# Patient Record
Sex: FEMALE | Race: ASIAN | NOT HISPANIC OR LATINO | Employment: FULL TIME | URBAN - METROPOLITAN AREA
[De-identification: names, ages, dates, MRNs, and addresses within clinical notes are randomized per-mention and may not be internally consistent; named-entity substitution may affect disease eponyms.]

---

## 2018-04-24 ENCOUNTER — COMMUNICATION - HEALTHEAST (OUTPATIENT)
Dept: SCHEDULING | Facility: CLINIC | Age: 28
End: 2018-04-24

## 2020-06-08 ENCOUNTER — COMMUNICATION - HEALTHEAST (OUTPATIENT)
Dept: SCHEDULING | Facility: CLINIC | Age: 30
End: 2020-06-08

## 2021-08-22 ENCOUNTER — HEALTH MAINTENANCE LETTER (OUTPATIENT)
Age: 31
End: 2021-08-22

## 2021-10-17 ENCOUNTER — HEALTH MAINTENANCE LETTER (OUTPATIENT)
Age: 31
End: 2021-10-17

## 2022-10-02 ENCOUNTER — HEALTH MAINTENANCE LETTER (OUTPATIENT)
Age: 32
End: 2022-10-02

## 2023-08-29 ENCOUNTER — LAB REQUISITION (OUTPATIENT)
Dept: LAB | Facility: CLINIC | Age: 33
End: 2023-08-29

## 2023-08-29 DIAGNOSIS — Z13.220 ENCOUNTER FOR SCREENING FOR LIPOID DISORDERS: ICD-10-CM

## 2023-08-29 DIAGNOSIS — Z13.1 ENCOUNTER FOR SCREENING FOR DIABETES MELLITUS: ICD-10-CM

## 2023-08-29 LAB
ANION GAP SERPL CALCULATED.3IONS-SCNC: 10 MMOL/L (ref 7–15)
BUN SERPL-MCNC: 12.8 MG/DL (ref 6–20)
CALCIUM SERPL-MCNC: 9 MG/DL (ref 8.6–10)
CHLORIDE SERPL-SCNC: 105 MMOL/L (ref 98–107)
CHOLEST SERPL-MCNC: 193 MG/DL
CREAT SERPL-MCNC: 0.84 MG/DL (ref 0.51–0.95)
DEPRECATED HCO3 PLAS-SCNC: 26 MMOL/L (ref 22–29)
GFR SERPL CREATININE-BSD FRML MDRD: >90 ML/MIN/1.73M2
GLUCOSE SERPL-MCNC: 96 MG/DL (ref 70–99)
HDLC SERPL-MCNC: 53 MG/DL
LDLC SERPL CALC-MCNC: 110 MG/DL
NONHDLC SERPL-MCNC: 140 MG/DL
POTASSIUM SERPL-SCNC: 4 MMOL/L (ref 3.4–5.3)
SODIUM SERPL-SCNC: 141 MMOL/L (ref 136–145)
TRIGL SERPL-MCNC: 152 MG/DL

## 2023-08-29 PROCEDURE — 80061 LIPID PANEL: CPT | Performed by: PHYSICIAN ASSISTANT

## 2023-08-29 PROCEDURE — 82310 ASSAY OF CALCIUM: CPT | Performed by: PHYSICIAN ASSISTANT

## 2023-10-21 ENCOUNTER — HEALTH MAINTENANCE LETTER (OUTPATIENT)
Age: 33
End: 2023-10-21

## 2024-02-06 DIAGNOSIS — M26.03 MANDIBULAR HYPERPLASIA: Primary | ICD-10-CM

## 2024-04-17 RX ORDER — TRANEXAMIC ACID 10 MG/ML
1 INJECTION, SOLUTION INTRAVENOUS ONCE
Status: CANCELLED | OUTPATIENT
Start: 2024-04-17 | End: 2024-04-17

## 2024-04-18 NOTE — PRE-PROCEDURE
"ORAL & MAXILLOFACIAL SURGERY   PREOPERATIVE  NOTE:  Arlet Arroyo,  MRN: [id number],  : 1990      Date of Procedure:   2024    Pre-Operative Diagnosis:  - Class III skeletal and dental relationship resulting in difficulty chewing and masticating.     Planned Procedure:  - BSSO and possibly a LeFort    Planned Anesthesia: General via nasal endotracheal tube    Attending Surgeon:  Dr. Johnathan Somers DDS    Resident Surgeon:  Dr. Mary Mortensen     Resident Assistants:  Dr. Taylor Valdez    Consent:  Written and verbal consent obtained from patient previously. Discussion included  risks/complications including but not limited post-operative pain, swelling, bleeding,  infection, hardware failure, nonunion, malunion, dehiscence of wounds,  temporary/permanent paresthesia/anesthesia of CN V3 mental nerve distribution/CN V2  maxillary nerve distribution, damage to CN VII (motor to muscles of facial expression)  with temporary or permanent paralysis, scar formation, malocclusion, failure to resolve  chief complaint, or need for additional procedures (occlusal equilibration/endodontic  therapy). Patient agrees to procedure as written, signed consent in chart.      Shreya Bowman, ZA  Oral & Maxillofacial Surgery, Intern   _________________________________________________________________________________________________________________    OMFS Orthognathic Consultation - 10/19/23    CC: \"My teeth don't come together. I have pain in my TMJs.\"    HPI:   32 female who presents to the Oral and Maxillofacial Surgery Department for evaluation of her skeletal-dental deformity. Patient was referred by their orthodontist, [facility] for preorthognathic evaluation. Patient was asked to fill out a motivation questionnaire in which they checked the following items: they would like to make the upper front teeth fit the lower jaw. They would like to move upper teeth down, so they will be able to bring their front " teeth together. In addition, the patient voices multiple functional concerns regarding inability to properly bite, chew, and swallow food primarily due to inability to bring their front teeth together and states that a portion of the food is often left behind when attempting to bite into something. Patient reports occasional pain/discomfort in their TMJs . She otherwise denies headache, chest pain, shortness of breath, numbness, swelling, dysphagia, drainage, foul taste, fever, chills, nausea, or vomiting.The patient stated that she has had TMJ issues due to her occlusion. She stated it has been his way for as long as she can remember, and when she touches the side of her face near her condyles she has pain.     The obstructive sleep apnea assessment was done during the interview. The patient does admit to snoring, and has been told that they stopped breathing at night. Patient reports excessive daytime sleepiness, but denies any frequent morning headaches and nightmares. The patient has never been diagnosed with sleep apnea, never had a sleep study, and never been treated for it. There is no history of TMJ pain, open or close lock.    PMH:   Non contributory    PSH:   denies    Meds:   denies    Allergies:  NKDA    Family History:  denies bleeding or anesthesia problems    Social History:   (-) tobacco, (-) etoh, (-) illicit or recreational drug use    ROS:   comprehensive review of systems completed and negative aside from listed in HPI.    Physical Exam:  GEN: WD/WN and NAD  HEENT: NC/AT, EOMI, PERRL, no facial edema, no asymmetry, no induration or erythema, no abnormal skin lesions, inferior border of mandible continuous, neck soft and supple with no palpable lymph nodes, and CAMILA >45mm  I/O: OP clear, uvula midline, fair OH, grossly intact dentition, no vestibular edema, FOM NT/ND, no erythema, no drainage, no purulence, no ulcerations, no lesions , #1 missing, #16 missing, #17 missing, and #32 missing  Neuro:  AAOx4, CN V grossly intact bilaterally, and CN VII grossly intact bilaterally  Cardio: warm, well profused and no pitting edema  Pulm: Breathing comfortably on room air, no respiratory distress    HEENT:  Facial exam reveals the following measurements  1. Upper facial third mm: 65  2. Middle facial third mm: 58  3. Lower facial third mm: 65  4. Upper lower third mm: 24  5. Lower lower two-thirds mm: 44  6. Scleral show mm: 0  7. Infraorbital rim projection mm: -2  8. Chin is deviation compared to midsagittal plane mm: Coincident  9. Chin projection: Normal  10. Labiomental fold: Normal    Lip - tooth measurements  1. Upper lip length mm: 24  2. Interlabial distance mm: 0  3. Upper incisor show at rest mm: 0  4. Gingival show at rest mm: 0  5. Upper incisors show with animation mm: 9  6. Gingival show with animation mm: 0    Nasal Exam:  1. Dorsal nasal projection mm: Normal  2. Dorsal convexity: Mild dorsal hump  3. Nasal septum deviations: Coincident  4. Nasal tip deviation: Right 1 mm  5. Alar base mm: 36  6. Nasolabial angle: Acute  7. Columella show mm: 3    Dental Examination:  1. Right canine to right medial canthus distance mm: 60  2. Left canine to left medial canthus distance is mm: 60  3. Occlusal cant mm: 0  4. Maxillary midline to midsagittal plane mm: Coincident  5. Mandibular midline to maxillary midline mm: Coincident  6. Mandibular midline to menton mm: Coincident  7. Mentum midline to midsagittal plane mm: Left 1 mm  8. The right angle classification: canine class III, molar class III  9. The left angle classification: canine class III, molar class III  10. Overjet mm: +1  11. Overbite mm: +2    Temporomandibular Joint Exam  1. Maximum interincisal distance mm: 55  2. Lateral excursive movements mm:  Right 3, Left 4  3. Protrusive movement mm: 12  4. Palpation of the joint reveals bilateral pain / discomfort, right reciprocal click, no clicking, and no crepitus    Radiographic Evaluation:  OPG -  Independently reviewed. 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     Plan:  Physical exam findings shared and discussed with the patient at length  Lengthy discussion with the patient regarding the risk, benefits, and alternatives of maxillary and mandibular surgery including, but not limited to bleeding, bruising, infection, swelling, pain, discomfort, osteomyelitis, osteonecrosis, malunion, nonunion, relapse, injury to nerves, vessels, teeth, and soft tissue, permanent facial numbness and weakness including permanent lip and chin numbness, permanent tongue numbness, permanent loss of face, blindness, need for another procedure, progression of temporomandibular joint symptoms/pain, unsightly scarring, blood loss, and need for transfusion as well as general anesthesia risk including heart attacks, stroke, and death.  Clinic to coordinate CBCT and intraoral scans to facilitate virtual surgical planning in the future when orthodontically decompensated  The proposed treatment plan will be discussed with the patient's orthodontist and once the orthodontic movements are finalized will proceed with virtual surgical planning to determine the final surgical plans.  All questions answered and all concerns addressed    They had the opportunity to ask questions which were answered to their satisfaction. This discussion will be repeated at additional preoperative appointments.    Shreya Bowman DMD  [facility] Intern    Findings, assessment, and plan discussed  with Johnathan Somers MD, OSBALDOS  See student/resident's note above for details. As the approving (supervising) , I attest that I've seen and evaluated the patient or reviewed the patient findings real time. I attest I was present for any critical or key portions of the treatment and agree with the student/resident's treatments, findings and plans as written.  Johnathan Somers DDS, MD, FACS  Staff, Oral and Maxillofacial Surgery

## 2024-04-18 NOTE — H&P
"ORAL & MAXILLOFACIAL SURGERY HISTORY AND PHYSICAL    CC: \"I'd like my teeth to come together, and I would like to move my chin back\"    HISTORY OF PRESENT ILLNESS:   34 y/o F presents for evaluation for orthognathic surgery, patient has class III skeletal and dental relationship resulting in difficulty chewing and masticating. She notes that she has been in orthodontic treatment for about 3 years and is ready for surgery. Denies any other constitutional symptoms.    PMH:  Denies    PAST SURGICAL HISTORY:  Denies    Patient has not had any general anesthetic, however she has immediate family members that have had general anesthetic without complications    MEDICATIONS:  Denies    ALLERGIES:  NKDA    SOCIAL HISTORY:  Social alcohol use    REVIEW OF SYSTEMS  ROS reviewed and negative aside from listed in HPI    PHYSICAL EXAM:  GEN: WD/WN, NAD  HEENT: NC/AT, EOMI, PERRL, no facial edema, induration or erythema, no abnormal skin lesions, inferior border of mandible is palpable bilaterally, neck soft with no palpable lymph nodes, CAMILA >45mm, OP clear, uvula midline, fair oral hygiene, intact adult dentition, no vestibular edema, FOM ND/NT, no erythema/ulcerations/pigmentations/exophytic lesions, class III dental and skeletal relationship, anterior cross bite, maxillary midline coincident with skeletal midline, with animation all maxillary incisor show with 2-3 mm of gingiva  CV: Warm and well-perfused, RRR, no murmurs/rubs/gallops.  PULM: Breathing comfortably on room air, CTAB, no rales/rhonchi/wheezing  GI: soft, ND/NT  MSK: FLOOD, 5/5 strength to all extremities, no peripheral extremity edema  Neuro: AAOx4, CN II-XII intact bilaterally    VITALS:  BP:   116/79     P: 87  SpO2: 99%  Ht: 5' 0\"         Wt: 58.1 kg     Mallampati II and normal neck mobility observed.    IMAGING:  CBCT obtained and reviewed on 4/1/24  Orthodontic treatment present, missing teeth #1, 16, 17, and 32, class III skeletal and dental relationship, " mandibular hyperplasia    ASSESSMENT:   34 y/o F presents for evaluation for orthognathic surgery, patient has class III skeletal and dental relationship resulting in difficulty chewing and masticating, patient indicated for orthognathic surgery    PLAN:  - VSP planned for 4/4/24, that will determine BSSO vs BSSO and LeFort  - Reviewed orthognathic surgery with patient and post operative course  - Encouraged and asnwered all questions    Risks and benefits explained to pt including, but not limited to;   Pain, bleeding, swelling, infections, remaining tooth roots, oroantral communication, nerve injury (permanent vs temporary).  Questions were invited and answered.  Anesthesia modalities covered, patient elects to have procedure done under [facility] in the OR    NV:   - After VSP review of surgical plan, then OR for orthognathic surgery     Mary Aparicio DDS  OMFS, PGY-4    Findings, assessment, and plan discussed with Dr. Somers    See student/resident's note above for details. As the approving (supervising) , I attest that I've seen and evaluated the patient or reviewed the patient findings real time. I attest I was present for any critical or key portions of the treatment and agree with the student/resident's treatments, findings and plans as written.  Johnathan Somers DDS, MD, FACS  Staff, Oral and Maxillofacial Surgery     See student/resident's note above for details. As the approving(supervising) , I attest that I've seen and evaluated the patient, was present for the critical or key portions of the treatment and agree with the student/resident's treatments, findings and plans as written.

## 2024-04-29 ENCOUNTER — ANESTHESIA EVENT (OUTPATIENT)
Dept: SURGERY | Facility: CLINIC | Age: 34
End: 2024-04-29
Payer: COMMERCIAL

## 2024-04-29 RX ORDER — OXYCODONE HYDROCHLORIDE 10 MG/1
10 TABLET ORAL
Status: CANCELLED | OUTPATIENT
Start: 2024-04-29

## 2024-04-29 RX ORDER — ONDANSETRON 4 MG/1
4 TABLET, ORALLY DISINTEGRATING ORAL EVERY 30 MIN PRN
Status: CANCELLED | OUTPATIENT
Start: 2024-04-29

## 2024-04-29 RX ORDER — OXYCODONE HYDROCHLORIDE 5 MG/1
5 TABLET ORAL
Status: CANCELLED | OUTPATIENT
Start: 2024-04-29

## 2024-04-29 RX ORDER — NALOXONE HYDROCHLORIDE 0.4 MG/ML
0.1 INJECTION, SOLUTION INTRAMUSCULAR; INTRAVENOUS; SUBCUTANEOUS
Status: CANCELLED | OUTPATIENT
Start: 2024-04-29

## 2024-04-29 RX ORDER — ONDANSETRON 2 MG/ML
4 INJECTION INTRAMUSCULAR; INTRAVENOUS EVERY 30 MIN PRN
Status: CANCELLED | OUTPATIENT
Start: 2024-04-29

## 2024-04-29 NOTE — ANESTHESIA PREPROCEDURE EVALUATION
"Anesthesia Pre-Procedure Evaluation    Patient: Arlet Arroyo   MRN: [id number] : 1990        Procedure : Procedure(s):  MANDIBULAR SAGITTAL SPLIT OSTEOTOMY          History reviewed. No pertinent past medical history.   Past Surgical History:   Procedure Laterality Date    NO HISTORY OF SURGERY        No Known Allergies   Social History     Tobacco Use    Smoking status: Never    Smokeless tobacco: Not on file   Substance Use Topics    Alcohol use: No      Wt Readings from Last 1 Encounters:   16 54.5 kg (120 lb 1.6 oz)        Anesthesia Evaluation   Pt has not had prior anesthetic         ROS/MED HX  ENT/Pulmonary: Comment: Mandibular hyperplasia, occlusal malalignment      Neurologic:  - neg neurologic ROS     Cardiovascular:  - neg cardiovascular ROS     METS/Exercise Tolerance: >4 METS    Hematologic:  - neg hematologic  ROS     Musculoskeletal:  - neg musculoskeletal ROS     GI/Hepatic:  - neg GI/hepatic ROS     Renal/Genitourinary:  - neg Renal ROS     Endo:  - neg endo ROS     Psychiatric/Substance Use:  - neg psychiatric ROS     Infectious Disease:  - neg infectious disease ROS     Malignancy:  - neg malignancy ROS     Other:  - neg other ROS          Physical Exam    Airway        Mallampati: II   TM distance: > 3 FB   Neck ROM: full   Mouth opening: > 3 cm    Respiratory Devices and Support         Dental     Comment: Braces on upper and lower teeth    (+) Completely normal teeth      Cardiovascular          Rhythm and rate: regular and normal     Pulmonary           breath sounds clear to auscultation           OUTSIDE LABS:  CBC: No results found for: \"WBC\", \"HGB\", \"HCT\", \"PLT\"  BMP:   Lab Results   Component Value Date     2023    POTASSIUM 4.0 2023    CHLORIDE 105 2023    CO2 26 2023    BUN 12.8 2023    CR 0.84 2023    GLC 96 2023     COAGS: No results found for: \"PTT\", \"INR\", \"FIBR\"  POC: No results found for: \"BGM\", \"HCG\", \"HCGS\"  HEPATIC: " "No results found for: \"ALBUMIN\", \"PROTTOTAL\", \"ALT\", \"AST\", \"GGT\", \"ALKPHOS\", \"BILITOTAL\", \"BILIDIRECT\", \"NEO\"  OTHER:   Lab Results   Component Value Date    KATERINA 9.0 08/29/2023       Anesthesia Plan    ASA Status:  1    NPO Status:  NPO Appropriate    Anesthesia Type: General.     - Airway: ETT   Induction: Intravenous, Propofol.   Maintenance: Balanced.   Techniques and Equipment:     - Airway: Nasal FLACO, Fiberoptic Bronchoscope (elective FOI)     - Lines/Monitors: 2nd IV, BIS     Consents    Anesthesia Plan(s) and associated risks, benefits, and realistic alternatives discussed. Questions answered and patient/representative(s) expressed understanding.     - Discussed: Risks, Benefits and Alternatives for BOTH SEDATION and the PROCEDURE were discussed     - Discussed with:  Patient      - Extended Intubation/Ventilatory Support Discussed: Yes.      - Patient is DNR/DNI Status: No     Use of blood products discussed: No .     Postoperative Care    Pain management: IV analgesics, Oral pain medications, Multi-modal analgesia.   PONV prophylaxis: Ondansetron (or other 5HT-3), Dexamethasone or Solumedrol, Scopolamine patch     Comments:               Michael Lopez MD    I have reviewed the pertinent notes and labs in the chart from the past 30 days and (re)examined the patient.  Any updates or changes from those notes are reflected in this note.                  "

## 2024-04-30 ENCOUNTER — HOSPITAL ENCOUNTER (OUTPATIENT)
Facility: CLINIC | Age: 34
Discharge: HOME | End: 2024-04-30
Attending: DENTIST | Admitting: DENTIST
Payer: COMMERCIAL

## 2024-04-30 ENCOUNTER — ANESTHESIA (OUTPATIENT)
Dept: SURGERY | Facility: CLINIC | Age: 34
End: 2024-04-30
Payer: COMMERCIAL

## 2024-04-30 ENCOUNTER — APPOINTMENT (OUTPATIENT)
Dept: INTERPRETER SERVICES | Facility: CLINIC | Age: 34
End: 2024-04-30
Attending: DENTIST
Payer: COMMERCIAL

## 2024-04-30 VITALS
OXYGEN SATURATION: 97 % | TEMPERATURE: 96.3 F | RESPIRATION RATE: 18 BRPM | DIASTOLIC BLOOD PRESSURE: 75 MMHG | WEIGHT: 130.29 LBS | SYSTOLIC BLOOD PRESSURE: 109 MMHG | BODY MASS INDEX: 25.58 KG/M2 | HEART RATE: 70 BPM | HEIGHT: 60 IN

## 2024-04-30 DIAGNOSIS — Q18.8 FACIAL DYSMORPHIA: Primary | ICD-10-CM

## 2024-04-30 LAB — HGB BLD-MCNC: 13.9 G/DL (ref 11.7–15.7)

## 2024-04-30 PROCEDURE — 250N000025 HC SEVOFLURANE, PER MIN: Performed by: DENTIST

## 2024-04-30 PROCEDURE — 370N000017 HC ANESTHESIA TECHNICAL FEE, PER MIN: Performed by: DENTIST

## 2024-04-30 PROCEDURE — 250N000011 HC RX IP 250 OP 636

## 2024-04-30 PROCEDURE — 250N000013 HC RX MED GY IP 250 OP 250 PS 637

## 2024-04-30 PROCEDURE — 36415 COLL VENOUS BLD VENIPUNCTURE: CPT | Performed by: ANESTHESIOLOGY

## 2024-04-30 PROCEDURE — 250N000009 HC RX 250

## 2024-04-30 PROCEDURE — C1713 ANCHOR/SCREW BN/BN,TIS/BN: HCPCS | Performed by: DENTIST

## 2024-04-30 PROCEDURE — 21195 RECONST LWR JAW W/O FIXATION: CPT | Performed by: ANESTHESIOLOGY

## 2024-04-30 PROCEDURE — 258N000003 HC RX IP 258 OP 636

## 2024-04-30 PROCEDURE — 272N000001 HC OR GENERAL SUPPLY STERILE: Performed by: DENTIST

## 2024-04-30 PROCEDURE — 85018 HEMOGLOBIN: CPT | Performed by: ANESTHESIOLOGY

## 2024-04-30 PROCEDURE — 710N000012 HC RECOVERY PHASE 2, PER MINUTE: Performed by: DENTIST

## 2024-04-30 PROCEDURE — 360N000077 HC SURGERY LEVEL 4, PER MIN: Performed by: DENTIST

## 2024-04-30 PROCEDURE — 250N000013 HC RX MED GY IP 250 OP 250 PS 637: Performed by: DENTIST

## 2024-04-30 PROCEDURE — 250N000009 HC RX 250: Performed by: DENTIST

## 2024-04-30 PROCEDURE — 710N000010 HC RECOVERY PHASE 1, LEVEL 2, PER MIN: Performed by: DENTIST

## 2024-04-30 PROCEDURE — 999N000141 HC STATISTIC PRE-PROCEDURE NURSING ASSESSMENT: Performed by: DENTIST

## 2024-04-30 DEVICE — IMP SCR SYN MATRIX COARSE PITCH 1.85X14MM ST 04.511.214.01: Type: IMPLANTABLE DEVICE | Site: MANDIBLE | Status: FUNCTIONAL

## 2024-04-30 DEVICE — IMP SCR SYN MATRIX COARSE PITCH 1.85X16MM ST 04.511.216.01: Type: IMPLANTABLE DEVICE | Site: MANDIBLE | Status: FUNCTIONAL

## 2024-04-30 DEVICE — IMP SCR SYN MATRIX COARSE PITCH 1.85X12MM ST 04.511.212.01: Type: IMPLANTABLE DEVICE | Site: MANDIBLE | Status: FUNCTIONAL

## 2024-04-30 RX ORDER — ONDANSETRON 4 MG/1
4 TABLET, ORALLY DISINTEGRATING ORAL EVERY 30 MIN PRN
Status: DISCONTINUED | OUTPATIENT
Start: 2024-04-30 | End: 2024-04-30 | Stop reason: HOSPADM

## 2024-04-30 RX ORDER — CHLORHEXIDINE GLUCONATE ORAL RINSE 1.2 MG/ML
SOLUTION DENTAL PRN
Status: DISCONTINUED | OUTPATIENT
Start: 2024-04-30 | End: 2024-04-30 | Stop reason: HOSPADM

## 2024-04-30 RX ORDER — ACETAMINOPHEN 160 MG/5ML
640 SUSPENSION ORAL EVERY 6 HOURS PRN
Qty: 473 ML | Refills: 0 | Status: SHIPPED | OUTPATIENT
Start: 2024-04-30 | End: 2024-05-07

## 2024-04-30 RX ORDER — BISACODYL 10 MG
10 SUPPOSITORY, RECTAL RECTAL DAILY PRN
Status: CANCELLED | OUTPATIENT
Start: 2024-05-03

## 2024-04-30 RX ORDER — PETROLATUM,WHITE
OINTMENT IN PACKET (GRAM) TOPICAL
Status: CANCELLED | OUTPATIENT
Start: 2024-04-30

## 2024-04-30 RX ORDER — KETOROLAC TROMETHAMINE 30 MG/ML
INJECTION, SOLUTION INTRAMUSCULAR; INTRAVENOUS PRN
Status: DISCONTINUED | OUTPATIENT
Start: 2024-04-30 | End: 2024-04-30

## 2024-04-30 RX ORDER — AMOXICILLIN AND CLAVULANATE POTASSIUM 400; 57 MG/5ML; MG/5ML
800 POWDER, FOR SUSPENSION ORAL 2 TIMES DAILY
Qty: 140 ML | Refills: 0 | Status: SHIPPED | OUTPATIENT
Start: 2024-04-30 | End: 2024-05-07

## 2024-04-30 RX ORDER — KETOROLAC TROMETHAMINE 30 MG/ML
30 INJECTION, SOLUTION INTRAMUSCULAR; INTRAVENOUS EVERY 6 HOURS
Status: CANCELLED | OUTPATIENT
Start: 2024-04-30 | End: 2024-05-05

## 2024-04-30 RX ORDER — GABAPENTIN 300 MG/1
300 CAPSULE ORAL ONCE
Status: COMPLETED | OUTPATIENT
Start: 2024-04-30 | End: 2024-04-30

## 2024-04-30 RX ORDER — FENTANYL CITRATE 50 UG/ML
INJECTION, SOLUTION INTRAMUSCULAR; INTRAVENOUS PRN
Status: DISCONTINUED | OUTPATIENT
Start: 2024-04-30 | End: 2024-04-30

## 2024-04-30 RX ORDER — SODIUM CHLORIDE, SODIUM LACTATE, POTASSIUM CHLORIDE, CALCIUM CHLORIDE 600; 310; 30; 20 MG/100ML; MG/100ML; MG/100ML; MG/100ML
INJECTION, SOLUTION INTRAVENOUS CONTINUOUS PRN
Status: DISCONTINUED | OUTPATIENT
Start: 2024-04-30 | End: 2024-04-30

## 2024-04-30 RX ORDER — ONDANSETRON 2 MG/ML
INJECTION INTRAMUSCULAR; INTRAVENOUS PRN
Status: DISCONTINUED | OUTPATIENT
Start: 2024-04-30 | End: 2024-04-30

## 2024-04-30 RX ORDER — HYDROMORPHONE HCL IN WATER/PF 6 MG/30 ML
0.4 PATIENT CONTROLLED ANALGESIA SYRINGE INTRAVENOUS EVERY 5 MIN PRN
Status: DISCONTINUED | OUTPATIENT
Start: 2024-04-30 | End: 2024-04-30 | Stop reason: HOSPADM

## 2024-04-30 RX ORDER — OXYCODONE HCL 5 MG/5 ML
5 SOLUTION, ORAL ORAL ONCE
Status: COMPLETED | OUTPATIENT
Start: 2024-04-30 | End: 2024-04-30

## 2024-04-30 RX ORDER — CEFAZOLIN SODIUM/WATER 2 G/20 ML
2 SYRINGE (ML) INTRAVENOUS
Status: COMPLETED | OUTPATIENT
Start: 2024-04-30 | End: 2024-04-30

## 2024-04-30 RX ORDER — IBUPROFEN 100 MG/5ML
600 SUSPENSION, ORAL (FINAL DOSE FORM) ORAL EVERY 6 HOURS PRN
Qty: 473 ML | Refills: 0 | Status: SHIPPED | OUTPATIENT
Start: 2024-04-30

## 2024-04-30 RX ORDER — AMPICILLIN AND SULBACTAM 2; 1 G/1; G/1
3 INJECTION, POWDER, FOR SOLUTION INTRAMUSCULAR; INTRAVENOUS EVERY 6 HOURS
Status: CANCELLED | OUTPATIENT
Start: 2024-04-30 | End: 2024-05-01

## 2024-04-30 RX ORDER — SODIUM CHLORIDE, SODIUM LACTATE, POTASSIUM CHLORIDE, CALCIUM CHLORIDE 600; 310; 30; 20 MG/100ML; MG/100ML; MG/100ML; MG/100ML
INJECTION, SOLUTION INTRAVENOUS CONTINUOUS
Status: DISCONTINUED | OUTPATIENT
Start: 2024-04-30 | End: 2024-04-30 | Stop reason: HOSPADM

## 2024-04-30 RX ORDER — ACETAMINOPHEN 325 MG/1
975 TABLET ORAL ONCE
Status: COMPLETED | OUTPATIENT
Start: 2024-04-30 | End: 2024-04-30

## 2024-04-30 RX ORDER — MELOXICAM 7.5 MG/1
15 TABLET ORAL ONCE
Status: COMPLETED | OUTPATIENT
Start: 2024-04-30 | End: 2024-04-30

## 2024-04-30 RX ORDER — ONDANSETRON 2 MG/ML
4 INJECTION INTRAMUSCULAR; INTRAVENOUS EVERY 30 MIN PRN
Status: DISCONTINUED | OUTPATIENT
Start: 2024-04-30 | End: 2024-04-30 | Stop reason: HOSPADM

## 2024-04-30 RX ORDER — ACETAMINOPHEN 325 MG/10.15ML
650 LIQUID ORAL EVERY 6 HOURS
Status: CANCELLED | OUTPATIENT
Start: 2024-04-30

## 2024-04-30 RX ORDER — ONDANSETRON 2 MG/ML
4 INJECTION INTRAMUSCULAR; INTRAVENOUS EVERY 6 HOURS PRN
Status: CANCELLED | OUTPATIENT
Start: 2024-04-30

## 2024-04-30 RX ORDER — FENTANYL CITRATE 50 UG/ML
50 INJECTION, SOLUTION INTRAMUSCULAR; INTRAVENOUS EVERY 5 MIN PRN
Status: DISCONTINUED | OUTPATIENT
Start: 2024-04-30 | End: 2024-04-30 | Stop reason: HOSPADM

## 2024-04-30 RX ORDER — BENZOCAINE/MENTHOL 6 MG-10 MG
LOZENGE MUCOUS MEMBRANE EVERY 6 HOURS PRN
Status: CANCELLED | OUTPATIENT
Start: 2024-04-30

## 2024-04-30 RX ORDER — DEXAMETHASONE SODIUM PHOSPHATE 10 MG/ML
8 INJECTION, SOLUTION INTRAMUSCULAR; INTRAVENOUS EVERY 8 HOURS
Status: CANCELLED | OUTPATIENT
Start: 2024-04-30 | End: 2024-05-01

## 2024-04-30 RX ORDER — ACETAMINOPHEN 325 MG/1
975 TABLET ORAL ONCE
Status: DISCONTINUED | OUTPATIENT
Start: 2024-04-30 | End: 2024-04-30 | Stop reason: HOSPADM

## 2024-04-30 RX ORDER — BUPIVACAINE HYDROCHLORIDE AND EPINEPHRINE 2.5; 5 MG/ML; UG/ML
INJECTION, SOLUTION EPIDURAL; INFILTRATION; INTRACAUDAL; PERINEURAL PRN
Status: DISCONTINUED | OUTPATIENT
Start: 2024-04-30 | End: 2024-04-30 | Stop reason: HOSPADM

## 2024-04-30 RX ORDER — ONDANSETRON 4 MG/1
4 TABLET, ORALLY DISINTEGRATING ORAL EVERY 8 HOURS PRN
Qty: 12 TABLET | Refills: 0 | Status: SHIPPED | OUTPATIENT
Start: 2024-04-30

## 2024-04-30 RX ORDER — HYDROMORPHONE HCL IN WATER/PF 6 MG/30 ML
0.2 PATIENT CONTROLLED ANALGESIA SYRINGE INTRAVENOUS EVERY 5 MIN PRN
Status: DISCONTINUED | OUTPATIENT
Start: 2024-04-30 | End: 2024-04-30 | Stop reason: HOSPADM

## 2024-04-30 RX ORDER — GABAPENTIN 250 MG/5ML
100 SOLUTION ORAL 3 TIMES DAILY
Qty: 450 ML | Refills: 0 | Status: SHIPPED | OUTPATIENT
Start: 2024-04-30

## 2024-04-30 RX ORDER — CHLORHEXIDINE GLUCONATE ORAL RINSE 1.2 MG/ML
15 SOLUTION DENTAL 2 TIMES DAILY
Qty: 473 ML | Refills: 0 | Status: SHIPPED | OUTPATIENT
Start: 2024-04-30

## 2024-04-30 RX ORDER — LIDOCAINE 40 MG/G
CREAM TOPICAL
Status: DISCONTINUED | OUTPATIENT
Start: 2024-04-30 | End: 2024-04-30 | Stop reason: HOSPADM

## 2024-04-30 RX ORDER — PROPOFOL 10 MG/ML
INJECTION, EMULSION INTRAVENOUS PRN
Status: DISCONTINUED | OUTPATIENT
Start: 2024-04-30 | End: 2024-04-30

## 2024-04-30 RX ORDER — ESMOLOL HYDROCHLORIDE 10 MG/ML
INJECTION INTRAVENOUS PRN
Status: DISCONTINUED | OUTPATIENT
Start: 2024-04-30 | End: 2024-04-30

## 2024-04-30 RX ORDER — ONDANSETRON 4 MG/1
4 TABLET, ORALLY DISINTEGRATING ORAL EVERY 6 HOURS PRN
Status: CANCELLED | OUTPATIENT
Start: 2024-04-30

## 2024-04-30 RX ORDER — POLYETHYLENE GLYCOL 3350 17 G/17G
17 POWDER, FOR SOLUTION ORAL DAILY
Status: CANCELLED | OUTPATIENT
Start: 2024-05-01

## 2024-04-30 RX ORDER — PROCHLORPERAZINE MALEATE 5 MG
10 TABLET ORAL EVERY 6 HOURS PRN
Status: CANCELLED | OUTPATIENT
Start: 2024-04-30

## 2024-04-30 RX ORDER — OXYCODONE HCL 5 MG/5 ML
5-10 SOLUTION, ORAL ORAL EVERY 6 HOURS PRN
Status: CANCELLED | OUTPATIENT
Start: 2024-04-30

## 2024-04-30 RX ORDER — GINSENG 100 MG
CAPSULE ORAL 2 TIMES DAILY
Status: CANCELLED | OUTPATIENT
Start: 2024-04-30

## 2024-04-30 RX ORDER — HYDROMORPHONE HCL IN WATER/PF 6 MG/30 ML
0.2 PATIENT CONTROLLED ANALGESIA SYRINGE INTRAVENOUS
Status: CANCELLED | OUTPATIENT
Start: 2024-04-30

## 2024-04-30 RX ORDER — LIDOCAINE 40 MG/G
CREAM TOPICAL
Status: CANCELLED | OUTPATIENT
Start: 2024-04-30

## 2024-04-30 RX ORDER — CEFAZOLIN SODIUM/WATER 2 G/20 ML
2 SYRINGE (ML) INTRAVENOUS SEE ADMIN INSTRUCTIONS
Status: DISCONTINUED | OUTPATIENT
Start: 2024-04-30 | End: 2024-04-30 | Stop reason: HOSPADM

## 2024-04-30 RX ORDER — HYDROMORPHONE HCL IN WATER/PF 6 MG/30 ML
0.4 PATIENT CONTROLLED ANALGESIA SYRINGE INTRAVENOUS
Status: CANCELLED | OUTPATIENT
Start: 2024-04-30

## 2024-04-30 RX ORDER — GABAPENTIN 250 MG/5ML
100 SOLUTION ORAL EVERY 8 HOURS
Status: CANCELLED | OUTPATIENT
Start: 2024-04-30

## 2024-04-30 RX ORDER — FENTANYL CITRATE 50 UG/ML
25 INJECTION, SOLUTION INTRAMUSCULAR; INTRAVENOUS EVERY 5 MIN PRN
Status: DISCONTINUED | OUTPATIENT
Start: 2024-04-30 | End: 2024-04-30 | Stop reason: HOSPADM

## 2024-04-30 RX ORDER — CHLORHEXIDINE GLUCONATE ORAL RINSE 1.2 MG/ML
10 SOLUTION DENTAL ONCE
Status: COMPLETED | OUTPATIENT
Start: 2024-04-30 | End: 2024-04-30

## 2024-04-30 RX ORDER — DEXAMETHASONE SODIUM PHOSPHATE 4 MG/ML
INJECTION, SOLUTION INTRA-ARTICULAR; INTRALESIONAL; INTRAMUSCULAR; INTRAVENOUS; SOFT TISSUE PRN
Status: DISCONTINUED | OUTPATIENT
Start: 2024-04-30 | End: 2024-04-30

## 2024-04-30 RX ORDER — SODIUM CHLORIDE, SODIUM LACTATE, POTASSIUM CHLORIDE, CALCIUM CHLORIDE 600; 310; 30; 20 MG/100ML; MG/100ML; MG/100ML; MG/100ML
INJECTION, SOLUTION INTRAVENOUS CONTINUOUS
Status: CANCELLED | OUTPATIENT
Start: 2024-04-30

## 2024-04-30 RX ORDER — SCOLOPAMINE TRANSDERMAL SYSTEM 1 MG/1
1 PATCH, EXTENDED RELEASE TRANSDERMAL ONCE
Status: DISCONTINUED | OUTPATIENT
Start: 2024-04-30 | End: 2024-04-30 | Stop reason: HOSPADM

## 2024-04-30 RX ORDER — BENZOCAINE/MENTHOL 6 MG-10 MG
LOZENGE MUCOUS MEMBRANE EVERY 6 HOURS PRN
Qty: 14.2 G | Refills: 0 | Status: SHIPPED | OUTPATIENT
Start: 2024-04-30 | End: 2024-05-07

## 2024-04-30 RX ORDER — NALOXONE HYDROCHLORIDE 0.4 MG/ML
0.1 INJECTION, SOLUTION INTRAMUSCULAR; INTRAVENOUS; SUBCUTANEOUS
Status: DISCONTINUED | OUTPATIENT
Start: 2024-04-30 | End: 2024-04-30 | Stop reason: HOSPADM

## 2024-04-30 RX ORDER — LIDOCAINE HYDROCHLORIDE 20 MG/ML
INJECTION, SOLUTION INFILTRATION; PERINEURAL PRN
Status: DISCONTINUED | OUTPATIENT
Start: 2024-04-30 | End: 2024-04-30

## 2024-04-30 RX ORDER — OXYCODONE HCL 5 MG/5 ML
5 SOLUTION, ORAL ORAL EVERY 6 HOURS PRN
Qty: 60 ML | Refills: 0 | Status: SHIPPED | OUTPATIENT
Start: 2024-04-30 | End: 2024-05-03

## 2024-04-30 RX ORDER — CHLORHEXIDINE GLUCONATE ORAL RINSE 1.2 MG/ML
15 SOLUTION DENTAL 2 TIMES DAILY
Status: CANCELLED | OUTPATIENT
Start: 2024-04-30

## 2024-04-30 RX ADMIN — FENTANYL CITRATE 50 MCG: 50 INJECTION INTRAMUSCULAR; INTRAVENOUS at 08:43

## 2024-04-30 RX ADMIN — DEXAMETHASONE SODIUM PHOSPHATE 8 MG: 4 INJECTION, SOLUTION INTRA-ARTICULAR; INTRALESIONAL; INTRAMUSCULAR; INTRAVENOUS; SOFT TISSUE at 08:22

## 2024-04-30 RX ADMIN — FENTANYL CITRATE 50 MCG: 50 INJECTION INTRAMUSCULAR; INTRAVENOUS at 08:07

## 2024-04-30 RX ADMIN — Medication 20 MG: at 08:47

## 2024-04-30 RX ADMIN — SODIUM CHLORIDE, POTASSIUM CHLORIDE, SODIUM LACTATE AND CALCIUM CHLORIDE: 600; 310; 30; 20 INJECTION, SOLUTION INTRAVENOUS at 08:22

## 2024-04-30 RX ADMIN — ONDANSETRON 4 MG: 2 INJECTION INTRAMUSCULAR; INTRAVENOUS at 09:57

## 2024-04-30 RX ADMIN — FENTANYL CITRATE 50 MCG: 50 INJECTION, SOLUTION INTRAMUSCULAR; INTRAVENOUS at 11:20

## 2024-04-30 RX ADMIN — ESMOLOL HYDROCHLORIDE 30 MG: 10 INJECTION, SOLUTION INTRAVENOUS at 08:56

## 2024-04-30 RX ADMIN — FENTANYL CITRATE 25 MCG: 50 INJECTION, SOLUTION INTRAMUSCULAR; INTRAVENOUS at 11:07

## 2024-04-30 RX ADMIN — Medication 2 G: at 08:33

## 2024-04-30 RX ADMIN — FENTANYL CITRATE 25 MCG: 50 INJECTION, SOLUTION INTRAMUSCULAR; INTRAVENOUS at 11:15

## 2024-04-30 RX ADMIN — OXYCODONE HYDROCHLORIDE 5 MG: 5 SOLUTION ORAL at 11:38

## 2024-04-30 RX ADMIN — ACETAMINOPHEN 975 MG: 325 TABLET, FILM COATED ORAL at 06:23

## 2024-04-30 RX ADMIN — SCOPALAMINE 1 PATCH: 1 PATCH, EXTENDED RELEASE TRANSDERMAL at 06:24

## 2024-04-30 RX ADMIN — MELOXICAM 15 MG: 7.5 TABLET ORAL at 06:52

## 2024-04-30 RX ADMIN — CHLORHEXIDINE GLUCONATE 0.12% ORAL RINSE 10 ML: 1.2 LIQUID ORAL at 06:24

## 2024-04-30 RX ADMIN — KETOROLAC TROMETHAMINE 15 MG: 30 INJECTION, SOLUTION INTRAMUSCULAR at 10:20

## 2024-04-30 RX ADMIN — PROPOFOL 150 MG: 10 INJECTION, EMULSION INTRAVENOUS at 08:07

## 2024-04-30 RX ADMIN — LIDOCAINE HYDROCHLORIDE 60 MG: 20 INJECTION, SOLUTION INFILTRATION; PERINEURAL at 08:07

## 2024-04-30 RX ADMIN — SUGAMMADEX 100 MG: 100 INJECTION, SOLUTION INTRAVENOUS at 10:37

## 2024-04-30 RX ADMIN — Medication 40 MG: at 08:07

## 2024-04-30 RX ADMIN — GABAPENTIN 300 MG: 300 CAPSULE ORAL at 06:24

## 2024-04-30 RX ADMIN — HYDROMORPHONE HYDROCHLORIDE 0.5 MG: 1 INJECTION, SOLUTION INTRAMUSCULAR; INTRAVENOUS; SUBCUTANEOUS at 09:03

## 2024-04-30 ASSESSMENT — ACTIVITIES OF DAILY LIVING (ADL)
ADLS_ACUITY_SCORE: 29
ADLS_ACUITY_SCORE: 30
ADLS_ACUITY_SCORE: 29

## 2024-04-30 NOTE — ANESTHESIA CARE TRANSFER NOTE
Patient: Arlet Arroyo    Procedure: Procedure(s):  MANDIBULAR SAGITTAL SPLIT OSTEOTOMY       Diagnosis: Mandibular hyperplasia [M26.03]  Diagnosis Additional Information: No value filed.    Anesthesia Type:   General     Note:    Oropharynx: oropharynx clear of all foreign objects and spontaneously breathing  Level of Consciousness: drowsy  Oxygen Supplementation: face mask  Level of Supplemental Oxygen (L/min / FiO2): 6  Independent Airway: airway patency satisfactory and stable  Dentition: dentition unchanged  Vital Signs Stable: post-procedure vital signs reviewed and stable  Report to RN Given: handoff report given  Patient transferred to: PACU    Handoff Report: Identifed the Patient, Identified the Reponsible Provider, Reviewed the pertinent medical history, Discussed the surgical course, Reviewed Intra-OP anesthesia mangement and issues during anesthesia, Set expectations for post-procedure period and Allowed opportunity for questions and acknowledgement of understanding      Vitals:  Vitals Value Taken Time   /104 04/30/24 1054   Temp     Pulse 90 04/30/24 1059   Resp 18 04/30/24 1059   SpO2 100 % 04/30/24 1059   Vitals shown include unfiled device data.    Electronically Signed By: Michael Lopez MD  April 30, 2024  11:00 AM

## 2024-04-30 NOTE — OP NOTE
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]    Postoperative Diagnosis  Same as previous     Procedure(s):   Bilateral Sagittal Split Ramus Osteotomies   Application of occlusal splints    Surgeon:  Johnathan Somers DDS, MD, FACS    Resident Surgeon(s):  Mary Aparicio DDS    Resident Assistants:  Taylor Valdez DDS    Other surgical staff (if any):  Circulator: Dariela Lundberg RN  Relief Circulator: Karrie Jennings RN  Scrub Person: Nona Jaimes; Johny Fierro    Anesthesia  Anesthesiologist: Nelson Clay MD  CRNA: Kranthi Mckeon APRN CRNA  Anesthesia Resident: Michael Lopez MD    EBL:   100 mL       Drains: None    Prosthetic Devices:   Implant Name Type Inv. Item Serial No.  Lot No. LRB No. Used Action   IMP SCR SYN MATRIX COARSE PITCH [id number] ST 04.511.212.01 - [id number] Metallic Hardware/[place] IMP SCR SYN MATRIX COARSE PITCH [id number] ST 04.511.212.01  [employer]   2 Implanted   IMP SCR SYN MATRIX COARSE PITCH [id number] ST 04.511.214.01 - [id number] Metallic Hardware/[place] IMP SCR SYN MATRIX COARSE PITCH [id number] ST 04.511.214.01  [employer]AT[employer]   3 Implanted   IMP SCR SYN MATRIX COARSE PITCH [id number] ST 04.511.216.01 - [id number] Metallic Hardware/[place] IMP SCR SYN MATRIX COARSE PITCH 1.12L21QA ST 04.511.216.01  [employer]   1 Implanted       Specimen Removed:   * No specimens in log *    Complications: none    Indications for Surgery:   Based on clinical and radiographic findings, the patient was offered BSSO set back and application of occlusal splints in an OR setting. Patient has class III skeletal and dental relationship that results in difficulty biting and chewing foods, in order to correct this the above procedures are necessary. The procedure, benefits, risks, alternatives including no treatment were discussed in detail with the patient and the patient elected to proceed with  the planned surgery.     Procedure description:   On the day of surgery, the patient was seen by myself and Dr. Somers in the pre-op holding area.  The procedure, benefits, risks, alternatives including no treatment were discussed again with the patient and an informed written consent was obtained for the planned procedure.  Patient was transported to the operating room and transferred to the OR table in a supine position.  Airway was secured via nasal tube by the anesthesia team.  Throat pack placed, mouth cleansed with chlorhexidine, and 10cc 0.25% marcaine with epinephrine was delivered as bilateral long buccal and A[place] blocks.  The patient was prepped and draped in a sterile fashion for the planned procedure.  Patient's care was given to the OMFS team for the planned procedure.  A surgical timeout was performed by all members of the team.       Attention turned to mandible  Attention was turned to the right-side of the mandibular arch.  Bovie electrocautery was used to create an incision over the external oblique ridge extending anteriorly to the mandibular 1st molar and posteriorly to the level of the occlusal plane.  Subperiosteal dissection was then performed to expose the external oblique ridge and medial aspect of the mandible until the mandibular lingula was encountered.  The temporalis muscle attachment was released from the coronoid process using a V notch retractor and dissection with mucoperiosteal elevator.  A curved Kocher on a chain was placed on the coronoid to aid in retraction.  The inferior alveolar nerve was identified and protected using a retractor at the level of the lingula.     The medial osteotomy was then performed using a saw under copious irrigation superior to the mandibular lingula toward the external oblique ridge.  The saw was used under copious irrigation was then used to extend the osteotomy anteriorly along the mandibular ramus paralleling the external oblique ridge of the  mandible extending to between the 1st and 2nd molar.  Bite block removed and channel retractor was then placed at the inferior border and the lateral osteotomy was performed using the piezoa under copious irrigation through the inferior border of the mandible.  The osteotomies were then connected using the saw under copious irrigation.     The osteotomies were then propagated to complete the sagittal split of the mandible using a series of osteotomes and a Smith  bilaterally.  Once , the neurovascular bundle of the inferior alveolar nerve was identified bilaterally and noted to be in the distal segments of the mandible.     Attention was turned to the left-side of the mandibular arch.  Bovie electrocautery was used to create an incision over the external oblique ridge extending anteriorly to the mandibular 1st molar and posteriorly to the level of the occlusal plane.  Subperiosteal dissection was then performed to expose the external oblique ridge and medial aspect of the mandible until the mandibular lingula was encountered.  The temporalis muscle attachment was released from the coronoid process using a V notch retractor and dissection with mucoperiosteal elevator.  A curved Kocher on a chain was placed on the coronoid to aid in retraction.  The inferior alveolar nerve was identified and protected using a retractor at the level of the lingula.     The medial osteotomy was then performed using a saw under copious irrigation superior to the mandibular lingula toward the external oblique ridge.  The saw was used under copious irrigation was then used to extend the osteotomy anteriorly along the mandibular ramus paralleling the external oblique ridge of the mandible extending to between the 1st and 2nd molar.  Bite block removed and channel retractor was then placed at the inferior border and the lateral osteotomy was performed using the saw under copious irrigation through the inferior border of the  mandible.  The osteotomies were then connected using the saw under copious irrigation.     The osteotomies were then propagated to complete the sagittal split of the mandible using a series of osteotomes and a Smith  bilaterally.  Once , the neurovascular bundle of the inferior alveolar nerve was identified bilaterally and noted to be in the distal segments of the mandible.     Small occlusal adjustments made to lingual cusp of tooth #5 and the buccal cusp of tooth #28 with a brooke round bur, tooth reduction kept to enamel, minimal amount of occlusal surface removed.     The final splint was then secured to the maxillary arch using 24-gauge wires.  Power chain was used to place the patient into maxillomandibular fixation.  Interferences were removed using a surgical handpiece and barrel-shaped bur.  The condyles were seated in the glenoid fossae bilaterally and the inferior border of the mandible of both proximal and distal segments aligned.  Pickle-fork used to hold the proximal and distal segments of the mandible in stabilization.  A 15-blade was then used for a small stab incision through the skin of the cheeks bilaterally and blunt dissection was performed into the oral cavity using a hemostat.  The trocar system was then introduced to allow for drilling of holes for positional screws.  Holes were predrilled through the trocar access and the bony segments were secured with bicortical positional screws along the superior border of the mandible bilaterally (total of 3 screws per side).     The patient was then released from intermaxillary fixation.  Occlusion was verified to be stable and reproducible.  The mandibular incisions were closed using 3-0 chromic gut sutures in continuous running fashion.  5-0 fast absorbing gut use for skin closure of bilateral cheek trochar incisions.  Bilateral guiding elastics placed.    Throat pack was removed     Thus, the planned procedure completed, the  patient's care was given back to the anesthesia team for extubation. The patient was extubated without any difficulty and transported to the PACU with stable vital signs and breathing spontaneously.     I was told by the OR nurse staff that all needles, sponges, instruments were found to be correct and there were no intraoperative complications noted.     Attending staff was present for the entire duration of the procedure.    Mary Aparicio DDS  [facility] [place] PGY-4

## 2024-04-30 NOTE — ANESTHESIA POSTPROCEDURE EVALUATION
Patient: Arlet Arroyo    Procedure: Procedure(s):  MANDIBULAR SAGITTAL SPLIT OSTEOTOMY       Anesthesia Type:  General    Note:  Disposition: Outpatient   Postop Pain Control: Uneventful            Sign Out: Well controlled pain   PONV: No   Neuro/Psych: Uneventful            Sign Out: Acceptable/Baseline neuro status   Airway/Respiratory: Uneventful            Sign Out: Acceptable/Baseline resp. status   CV/Hemodynamics: Uneventful            Sign Out: Acceptable CV status; No obvious hypovolemia; No obvious fluid overload   Other NRE: NONE   DID A NON-ROUTINE EVENT OCCUR?            Last vitals:  Vitals Value Taken Time   /70 04/30/24 1130   Temp 36.2  C (97.2  F) 04/30/24 1100   Pulse 63 04/30/24 1132   Resp 13 04/30/24 1132   SpO2 95 % 04/30/24 1132   Vitals shown include unfiled device data.    Electronically Signed By: Michael Lopez MD  April 30, 2024  11:32 AM

## 2024-04-30 NOTE — BRIEF OP NOTE
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  Complications: None.  Implants:   Implant Name Type Inv. Item Serial No.  Lot No. LRB No. Used Action   IMP SCR SYN MATRIX COARSE PITCH [id number] ST 04.511.212.01 - [id number] Metallic Hardware/[place] IMP SCR SYN MATRIX COARSE PITCH [id number] ST 04.511.212.01  [employer]AT[employer]   2 Implanted   IMP SCR SYN MATRIX COARSE PITCH 1.70L17YZ ST 04.511.214.01 - [id number] Metallic Hardware/[place] IMP SCR SYN MATRIX COARSE PITCH 1.95W95IE ST 04.511.214.01  [employer]-STRATEC   3 Implanted   IMP SCR SYN MATRIX COARSE PITCH 1.04I27AM ST 04.511.216.01 - [id number] Metallic Hardware/[place] IMP SCR SYN MATRIX COARSE PITCH 1.78Y98WY ST 04.511.216.01  [employer]-STRATEC   1 Implanted

## 2024-04-30 NOTE — LETTER
[facility]  [address] [address]  Phone: [phone]    April 30, 2024        Arlet Arroyo  [address] [place] [address]          To whom it may concern:    RE: Arlet Arroyo    Patient was seen and treated today at our clinic. Patient must be excused from work and able to return on 28 May 2024.  When the patient returns to work, the following restrictions apply until 31 May 2024:    Lift, carry, push, and pull no more than: 10-15 lbs  Must be on light duty, with no strenuous activity until approved by her physician.     Please contact me for questions or concerns.      Sincerely,        Duncan James RN    On behalf of    Johnathan Somers DDS

## 2024-04-30 NOTE — DISCHARGE INSTRUCTIONS
Swelling   Swelling occurs after jaw surgery. To minimize keep head elevated at least 30  Degrees. Once swelling is present it can remain for 2 weeks.    You should use ice packs on your cheeks for 20 minutes on, 20 minutes off for the first 2-3 days to help minimize swelling. After that time, you should use heat (30 minutes on, 30 minutes off) to help continue to reduce swelling.    Bleeding   A small amount of bleeding is expected for up to 24 hours. Bleeding from the nose is common after upper jaw surgery. Blowing your nose is highly not advised as it will increase risk of bleeding and blow air under your skin around your eyes, subcutaneous emphysema. If you do experience bleeding, pince nose and apply pressure until bleeding subsides. Excessive bleeding is not normal, please contact the Oral and Maxillofacial Surgery Clinic or the Oral and Maxillofacial Surgery Resident on call should this occur.      Nausea   Nausea is common after jaw surgery. It may be reduced by taking small amount food with pain medication. Drink large amounts of water when taking pain medications. Call clinic if repeated vomitting is a problem.     Oral Hygiene    Oral hygiene should be resumed 24 hours after jaw surgery, brushing and rinsing mouth.  After jaw surgery you may have a splint wired to your teeth, which will allow plaque accumulation. Therefore, maintaining a clean mouth is essential. Brush gently, taking care not to disturb the wounds. Occasional bleeding and discomfort following tooth brushing is common.    Use the prescribed chlorhexidine mouth rinse twice a day as instructed.    Rinse with warm salt water 3 times a day or after meals.    Activity Restrictions:   No lifting > 10-15 pounds for 4 weeks following surgery.    No strenuous activity or exercise for 4-6 weeks after surgery or as instructed otherwise.   No swimming or submerging head/wounds under water until approved following surgery, at least 4 weeks.   No  driving while taking narcotic pain medication    Diet:   Full liquid, non-chew diet for 6 week. Meal supplements such age Boost and Ensure are helpful.    Other instructions:   Please reference your Orthognathic Jaw Surgery Folder provided to your at your pre-operative appointment for further instructions and what you can expect post-operatively.  Please keep elastics in place full time for the next week (Ok to remove for meals), replace as elastics break, OMS team will re-evaluate need for them at first follow up appointment    When to call:   If extreme increase in swelling, extensive discharge in mouth bloody or purulent, severe fevers/chills (above 101.4), please call Oral & Maxillofacial Surgery during business hours at [phone] or call [facility]  and ask for Oral & Maxillofacial Surgery resident on call after hours. If greatly concerned, please present to the [facility] emergency. Phone number: [phone]      Contacting your Doctor -   To contact a doctor, call Dr Somers's office at [phone] or:  [phone] and ask for the resident on call for Oral Maxillofacial Surgery (answered 24 hours a day)   Emergency Department:  [facility]: [phone]  [facility]: [phone] 911 if you are in need of immediate or emergent help

## 2024-04-30 NOTE — ANESTHESIA PROCEDURE NOTES
Airway       Patient location during procedure: OR       Procedure Start/Stop Times: 4/30/2024 8:16 AM  Staff -        Anesthesiologist:  Nelson Clay MD       Resident/Fellow: Michael Lopez MD       Performed By: resident  Consent for Airway        Urgency: elective  Indications and Patient Condition       Indications for airway management: darryl-procedural and airway protection       Induction type:intravenous       Mask difficulty assessment: 1 - vent by mask    Final Airway Details       Final airway type: endotracheal airway       Successful airway: Nasal and FLACO  Endotracheal Airway Details        ETT size (mm): 7.0       Cuffed: yes       Successful intubation technique: flexible bronchoscopy (elective fiberoptic nasal for planned nasal inubation)       Grade View of Cords: 1       Adjucts: stylet       Position: Right       Measured from: nares       Secured at (cm): 25       Bite block used: None    Post intubation assessment        Placement verified by: capnometry, equal breath sounds and chest rise        Number of attempts at approach: 1       Number of other approaches attempted: 0       Secured with: pink tape       Ease of procedure: easy       Dentition: Unchanged and Intact    Medication(s) Administered   Medication Administration Time: 4/30/2024 8:16 AM        
normal (ped)...

## 2024-11-22 ENCOUNTER — LAB REQUISITION (OUTPATIENT)
Dept: LAB | Facility: CLINIC | Age: 34
End: 2024-11-22

## 2024-11-22 DIAGNOSIS — N97.9 FEMALE INFERTILITY, UNSPECIFIED: ICD-10-CM

## 2024-11-22 PROCEDURE — 83001 ASSAY OF GONADOTROPIN (FSH): CPT | Performed by: PHYSICIAN ASSISTANT

## 2024-11-22 PROCEDURE — 82670 ASSAY OF TOTAL ESTRADIOL: CPT | Performed by: PHYSICIAN ASSISTANT

## 2024-11-22 PROCEDURE — 84144 ASSAY OF PROGESTERONE: CPT | Performed by: PHYSICIAN ASSISTANT

## 2024-11-22 PROCEDURE — 84443 ASSAY THYROID STIM HORMONE: CPT | Performed by: PHYSICIAN ASSISTANT

## 2024-11-23 LAB
ESTRADIOL SERPL-MCNC: 33 PG/ML
FSH SERPL IRP2-ACNC: 7.1 MIU/ML
PROGEST SERPL-MCNC: 0.7 NG/ML
TSH SERPL DL<=0.005 MIU/L-ACNC: 0.98 UIU/ML (ref 0.3–4.2)

## 2024-12-14 ENCOUNTER — HEALTH MAINTENANCE LETTER (OUTPATIENT)
Age: 34
End: 2024-12-14

## (undated) DEVICE — PREP POVIDONE-IODINE 7.5% SCRUB 4OZ BOTTLE MDS093945

## (undated) DEVICE — DRAPE SHEET REV FOLD 3/4 9349

## (undated) DEVICE — CAST PADDING 4" STERILE 9044S

## (undated) DEVICE — PAD CHUX UNDERPAD 23X24" 7136

## (undated) DEVICE — PREP SKIN SCRUB TRAY 4461A

## (undated) DEVICE — DRILL BIT SYN 1.8X100 TROCAR CALIBRATED QC 317.876

## (undated) DEVICE — ESU GROUND PAD ADULT W/CORD E7507

## (undated) DEVICE — PREP POVIDONE-IODINE 10% SOLUTION 4OZ BOTTLE MDS093944

## (undated) DEVICE — BUR STRK ROUND 3.2X54MM 10 FLUTE 1608-002-011

## (undated) DEVICE — SU PLAIN FAST ABSORB 5-0 PC-1 18" 1915G

## (undated) DEVICE — BUR STRK EGG 6.0X9.5X44.5MM 14 FLUTE 1607-002-003

## (undated) DEVICE — SU CHROMIC 3-0 SH 27" G122H

## (undated) DEVICE — SOL WATER IRRIG 1000ML BOTTLE 2F7114

## (undated) DEVICE — PACK NEURO MINOR UMMC SNE32MNMU4

## (undated) DEVICE — DRSG JAWBRA  95

## (undated) DEVICE — BLADE SAW RECIP STRK LG TEAR 7.0X14MM 5100-037-114

## (undated) DEVICE — BUR STRK EGG 4.0X44.5MM 10 FLUTE 1607-002-035

## (undated) DEVICE — SPLINT PATIENT SPECIFIC ORTHO FINAL SD900.106

## (undated) DEVICE — SOL NACL 0.9% IRRIG 1000ML BOTTLE 07138-09

## (undated) DEVICE — TAPE CLOTH ADHESIVE 3" LATEX 3554C

## (undated) DEVICE — GLOVE BIOGEL PI SZ 8.5 40885

## (undated) DEVICE — DRSG GAUZE 4X4" 8044

## (undated) DEVICE — LINEN TOWEL PACK X6 WHITE 5487

## (undated) DEVICE — SU CHROMIC 3-0 FS-2 27" 636

## (undated) DEVICE — BLADE SAW RECIP STRK PRECISION THIN 27X0.38MM 5100-137-233

## (undated) DEVICE — DRSG GAUZE 4X4" TRAY

## (undated) DEVICE — LINEN TOWEL PACK X30 5481

## (undated) DEVICE — BUR STRK ROUND DIAMOND 3.0X54MM COARSE 1608-006-091

## (undated) DEVICE — ESU ELEC NDL 1" COATED/INSULATED E1465

## (undated) DEVICE — KIT PATIENT SPECIFIC INSTR/PLAN ORTHO 1 SPLINT SD900.009

## (undated) DEVICE — SYRINGE EAR/ULCER STERILE 2 OZ BULB 4172

## (undated) DEVICE — ADH LIQUID MASTISOL TOPICAL VIAL 2-3ML 0523-48

## (undated) DEVICE — SUCTION MANIFOLD NEPTUNE 2 SYS 4 PORT 0702-020-000

## (undated) DEVICE — TOOTHBRUSH ADULT NON STERILE MDS136850

## (undated) RX ORDER — ACETAMINOPHEN 325 MG/1
TABLET ORAL
Status: DISPENSED
Start: 2024-04-30

## (undated) RX ORDER — BUPIVACAINE HYDROCHLORIDE AND EPINEPHRINE 2.5; 5 MG/ML; UG/ML
INJECTION, SOLUTION EPIDURAL; INFILTRATION; INTRACAUDAL; PERINEURAL
Status: DISPENSED
Start: 2024-04-30

## (undated) RX ORDER — GABAPENTIN 300 MG/1
CAPSULE ORAL
Status: DISPENSED
Start: 2024-04-30

## (undated) RX ORDER — CHLORHEXIDINE GLUCONATE ORAL RINSE 1.2 MG/ML
SOLUTION DENTAL
Status: DISPENSED
Start: 2024-04-30

## (undated) RX ORDER — OXYCODONE HCL 5 MG/5 ML
SOLUTION, ORAL ORAL
Status: DISPENSED
Start: 2024-04-30

## (undated) RX ORDER — OXYMETAZOLINE HYDROCHLORIDE 0.05 G/100ML
SPRAY NASAL
Status: DISPENSED
Start: 2024-04-30

## (undated) RX ORDER — PROPOFOL 10 MG/ML
INJECTION, EMULSION INTRAVENOUS
Status: DISPENSED
Start: 2024-04-30

## (undated) RX ORDER — DEXAMETHASONE SODIUM PHOSPHATE 4 MG/ML
INJECTION, SOLUTION INTRA-ARTICULAR; INTRALESIONAL; INTRAMUSCULAR; INTRAVENOUS; SOFT TISSUE
Status: DISPENSED
Start: 2024-04-30

## (undated) RX ORDER — SCOLOPAMINE TRANSDERMAL SYSTEM 1 MG/1
PATCH, EXTENDED RELEASE TRANSDERMAL
Status: DISPENSED
Start: 2024-04-30

## (undated) RX ORDER — ONDANSETRON 2 MG/ML
INJECTION INTRAMUSCULAR; INTRAVENOUS
Status: DISPENSED
Start: 2024-04-30

## (undated) RX ORDER — FENTANYL CITRATE 50 UG/ML
INJECTION, SOLUTION INTRAMUSCULAR; INTRAVENOUS
Status: DISPENSED
Start: 2024-04-30

## (undated) RX ORDER — HYDROMORPHONE HYDROCHLORIDE 1 MG/ML
INJECTION, SOLUTION INTRAMUSCULAR; INTRAVENOUS; SUBCUTANEOUS
Status: DISPENSED
Start: 2024-04-30

## (undated) RX ORDER — CEFAZOLIN SODIUM/WATER 2 G/20 ML
SYRINGE (ML) INTRAVENOUS
Status: DISPENSED
Start: 2024-04-30

## (undated) RX ORDER — FENTANYL CITRATE-0.9 % NACL/PF 10 MCG/ML
PLASTIC BAG, INJECTION (ML) INTRAVENOUS
Status: DISPENSED
Start: 2024-04-30